# Patient Record
Sex: MALE | Race: WHITE | NOT HISPANIC OR LATINO | URBAN - METROPOLITAN AREA
[De-identification: names, ages, dates, MRNs, and addresses within clinical notes are randomized per-mention and may not be internally consistent; named-entity substitution may affect disease eponyms.]

---

## 2017-03-04 ENCOUNTER — EMERGENCY (EMERGENCY)
Facility: HOSPITAL | Age: 57
LOS: 0 days | Discharge: HOME | End: 2017-03-05

## 2017-06-27 DIAGNOSIS — M54.2 CERVICALGIA: ICD-10-CM
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
## 2017-06-27 DIAGNOSIS — E11.9 TYPE 2 DIABETES MELLITUS WITHOUT COMPLICATIONS: ICD-10-CM

## 2017-06-27 DIAGNOSIS — R52 PAIN, UNSPECIFIED: ICD-10-CM

## 2017-06-27 DIAGNOSIS — Z88.8 ALLERGY STATUS TO OTHER DRUGS, MEDICAMENTS AND BIOLOGICAL SUBSTANCES STATUS: ICD-10-CM
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
## 2017-06-27 DIAGNOSIS — K21.9 GASTRO-ESOPHAGEAL REFLUX DISEASE WITHOUT ESOPHAGITIS: ICD-10-CM

## 2017-06-27 DIAGNOSIS — Z88.5 ALLERGY STATUS TO NARCOTIC AGENT: ICD-10-CM
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
## 2017-09-11 ENCOUNTER — EMERGENCY (EMERGENCY)
Facility: HOSPITAL | Age: 57
LOS: 0 days | Discharge: HOME | End: 2017-09-11
Admitting: INTERNAL MEDICINE

## 2017-09-11 DIAGNOSIS — K21.9 GASTRO-ESOPHAGEAL REFLUX DISEASE WITHOUT ESOPHAGITIS: ICD-10-CM
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
## 2017-09-11 DIAGNOSIS — Z88.5 ALLERGY STATUS TO NARCOTIC AGENT: ICD-10-CM

## 2017-09-11 DIAGNOSIS — E11.9 TYPE 2 DIABETES MELLITUS WITHOUT COMPLICATIONS: ICD-10-CM

## 2017-09-11 DIAGNOSIS — S63.254A UNSPECIFIED DISLOCATION OF RIGHT RING FINGER, INITIAL ENCOUNTER: ICD-10-CM
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
## 2017-09-11 DIAGNOSIS — Z88.8 ALLERGY STATUS TO OTHER DRUGS, MEDICAMENTS AND BIOLOGICAL SUBSTANCES: ICD-10-CM
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
## 2017-09-11 DIAGNOSIS — Z91.048 OTHER NONMEDICINAL SUBSTANCE ALLERGY STATUS: ICD-10-CM

## 2017-09-11 DIAGNOSIS — Z79.899 OTHER LONG TERM (CURRENT) DRUG THERAPY: ICD-10-CM

## 2017-09-11 DIAGNOSIS — M79.644 PAIN IN RIGHT FINGER(S): ICD-10-CM

## 2018-01-12 PROBLEM — Z00.00 ENCOUNTER FOR PREVENTIVE HEALTH EXAMINATION: Status: ACTIVE | Noted: 2018-01-12

## 2018-01-31 ENCOUNTER — APPOINTMENT (OUTPATIENT)
Dept: UROLOGY | Facility: CLINIC | Age: 58
End: 2018-01-31
Payer: COMMERCIAL

## 2018-01-31 VITALS
BODY MASS INDEX: 26.18 KG/M2 | DIASTOLIC BLOOD PRESSURE: 63 MMHG | WEIGHT: 187 LBS | SYSTOLIC BLOOD PRESSURE: 110 MMHG | HEART RATE: 90 BPM | HEIGHT: 71 IN

## 2018-01-31 DIAGNOSIS — N41.9 INFLAMMATORY DISEASE OF PROSTATE, UNSPECIFIED: ICD-10-CM

## 2018-01-31 DIAGNOSIS — Z87.891 PERSONAL HISTORY OF NICOTINE DEPENDENCE: ICD-10-CM

## 2018-01-31 DIAGNOSIS — Z80.9 FAMILY HISTORY OF MALIGNANT NEOPLASM, UNSPECIFIED: ICD-10-CM

## 2018-01-31 DIAGNOSIS — F41.9 ANXIETY DISORDER, UNSPECIFIED: ICD-10-CM

## 2018-01-31 DIAGNOSIS — K21.9 GASTRO-ESOPHAGEAL REFLUX DISEASE W/OUT ESOPHAGITIS: ICD-10-CM

## 2018-01-31 DIAGNOSIS — Z78.9 OTHER SPECIFIED HEALTH STATUS: ICD-10-CM

## 2018-01-31 PROCEDURE — 99213 OFFICE O/P EST LOW 20 MIN: CPT

## 2018-01-31 RX ORDER — TAMSULOSIN HYDROCHLORIDE 0.4 MG/1
0.4 CAPSULE ORAL
Qty: 90 | Refills: 3 | Status: ACTIVE | COMMUNITY
Start: 2018-01-31 | End: 1900-01-01

## 2018-01-31 RX ORDER — OMEPRAZOLE 40 MG/1
40 CAPSULE, DELAYED RELEASE ORAL
Refills: 0 | Status: ACTIVE | COMMUNITY

## 2019-01-23 ENCOUNTER — APPOINTMENT (OUTPATIENT)
Dept: UROLOGY | Facility: CLINIC | Age: 59
End: 2019-01-23

## 2019-06-21 ENCOUNTER — APPOINTMENT (OUTPATIENT)
Dept: UROLOGY | Facility: CLINIC | Age: 59
End: 2019-06-21
Payer: COMMERCIAL

## 2019-06-21 VITALS
DIASTOLIC BLOOD PRESSURE: 63 MMHG | SYSTOLIC BLOOD PRESSURE: 110 MMHG | HEART RATE: 90 BPM | BODY MASS INDEX: 25.76 KG/M2 | WEIGHT: 184 LBS | HEIGHT: 71 IN

## 2019-06-21 DIAGNOSIS — N13.8 BENIGN PROSTATIC HYPERPLASIA WITH LOWER URINARY TRACT SYMPMS: ICD-10-CM

## 2019-06-21 DIAGNOSIS — N41.1 CHRONIC PROSTATITIS: ICD-10-CM

## 2019-06-21 DIAGNOSIS — N40.1 BENIGN PROSTATIC HYPERPLASIA WITH LOWER URINARY TRACT SYMPMS: ICD-10-CM

## 2019-06-21 PROCEDURE — 99213 OFFICE O/P EST LOW 20 MIN: CPT

## 2019-06-21 NOTE — ASSESSMENT
[Chronic Prostatitis (601.1\N41.1)] : of ~T knee [FreeTextEntry1] : Patient clinically has prostatitis along with lower urinary tract symptoms and will be treated with antibiotic Bactrim as he states he was given 2 course of Cipro by pmd without relief. Renal bladder sonogram will be done to check for hydronephrosis and bladder emptying. Likely has new-onset diabetes and will get fasting blood sugar and this may be contributing to his symptoms if his blood sugar is high

## 2019-06-21 NOTE — PHYSICAL EXAM
[General Appearance - In No Acute Distress] : no acute distress [Costovertebral Angle Tenderness] : no ~M costovertebral angle tenderness [Abdomen Soft] : soft [Abdomen Tenderness] : non-tender [No Prostate Nodules] : no prostate nodules [Prostate Size ___ gm] : prostate size [unfilled] gm [] : no respiratory distress [Oriented To Time, Place, And Person] : oriented to person, place, and time [Normal Station and Gait] : the gait and station were normal for the patient's age [FreeTextEntry1] : Prostate tender

## 2019-06-21 NOTE — REVIEW OF SYSTEMS
[Dysuria] : dysuria [Chest Pain] : no chest pain [Fever] : no fever [Confused] : no confusion [Shortness Of Breath] : no shortness of breath [Constipation] : no constipation

## 2019-06-21 NOTE — HISTORY OF PRESENT ILLNESS
[Hematuria - Gross] : no gross hematuria [FreeTextEntry1] : 59-year-old with history of BPH and lower urinary tract symptoms. 31 cc prostate on transrectal ultrasound in the past. PSA 2 years ago was 1.6. He is on tamsulosin 0.4 daily. He also has a history of prostatitis.\par \par A last several weeks he has had increased frequency urgency dysuria. Poor urinary stream with dribbling. Also pelvic pain and bilateral flank pain.\par \par UA shows no leukocytes or blood but does show sugar

## 2019-08-02 ENCOUNTER — APPOINTMENT (OUTPATIENT)
Dept: UROLOGY | Facility: CLINIC | Age: 59
End: 2019-08-02

## 2019-09-24 ENCOUNTER — APPOINTMENT (OUTPATIENT)
Dept: UROLOGY | Facility: CLINIC | Age: 59
End: 2019-09-24

## 2020-10-05 ENCOUNTER — EMERGENCY (EMERGENCY)
Facility: HOSPITAL | Age: 60
LOS: 0 days | Discharge: HOME | End: 2020-10-05
Attending: STUDENT IN AN ORGANIZED HEALTH CARE EDUCATION/TRAINING PROGRAM
Payer: COMMERCIAL

## 2020-10-05 VITALS
HEART RATE: 90 BPM | WEIGHT: 179.9 LBS | DIASTOLIC BLOOD PRESSURE: 71 MMHG | RESPIRATION RATE: 18 BRPM | SYSTOLIC BLOOD PRESSURE: 151 MMHG | OXYGEN SATURATION: 100 % | TEMPERATURE: 99 F

## 2020-10-05 VITALS — RESPIRATION RATE: 18 BRPM | SYSTOLIC BLOOD PRESSURE: 132 MMHG | DIASTOLIC BLOOD PRESSURE: 72 MMHG | HEART RATE: 82 BPM

## 2020-10-05 DIAGNOSIS — Z20.828 CONTACT WITH AND (SUSPECTED) EXPOSURE TO OTHER VIRAL COMMUNICABLE DISEASES: ICD-10-CM

## 2020-10-05 DIAGNOSIS — J45.909 UNSPECIFIED ASTHMA, UNCOMPLICATED: ICD-10-CM

## 2020-10-05 DIAGNOSIS — R19.7 DIARRHEA, UNSPECIFIED: ICD-10-CM

## 2020-10-05 DIAGNOSIS — R10.30 LOWER ABDOMINAL PAIN, UNSPECIFIED: ICD-10-CM

## 2020-10-05 DIAGNOSIS — F17.210 NICOTINE DEPENDENCE, CIGARETTES, UNCOMPLICATED: ICD-10-CM

## 2020-10-05 DIAGNOSIS — R50.9 FEVER, UNSPECIFIED: ICD-10-CM

## 2020-10-05 DIAGNOSIS — R51.9 HEADACHE, UNSPECIFIED: ICD-10-CM

## 2020-10-05 DIAGNOSIS — K21.9 GASTRO-ESOPHAGEAL REFLUX DISEASE WITHOUT ESOPHAGITIS: ICD-10-CM

## 2020-10-05 DIAGNOSIS — R10.9 UNSPECIFIED ABDOMINAL PAIN: ICD-10-CM

## 2020-10-05 DIAGNOSIS — E11.9 TYPE 2 DIABETES MELLITUS WITHOUT COMPLICATIONS: ICD-10-CM

## 2020-10-05 DIAGNOSIS — R05 COUGH: ICD-10-CM

## 2020-10-05 LAB
ALBUMIN SERPL ELPH-MCNC: 4.4 G/DL — SIGNIFICANT CHANGE UP (ref 3.5–5.2)
ALP SERPL-CCNC: 87 U/L — SIGNIFICANT CHANGE UP (ref 30–115)
ALT FLD-CCNC: 22 U/L — SIGNIFICANT CHANGE UP (ref 0–41)
ANION GAP SERPL CALC-SCNC: 11 MMOL/L — SIGNIFICANT CHANGE UP (ref 7–14)
ANISOCYTOSIS BLD QL: SLIGHT — SIGNIFICANT CHANGE UP
APPEARANCE UR: CLEAR — SIGNIFICANT CHANGE UP
AST SERPL-CCNC: 37 U/L — SIGNIFICANT CHANGE UP (ref 0–41)
BASOPHILS # BLD AUTO: 0.04 K/UL — SIGNIFICANT CHANGE UP (ref 0–0.2)
BASOPHILS NFR BLD AUTO: 0.9 % — SIGNIFICANT CHANGE UP (ref 0–1)
BILIRUB SERPL-MCNC: 0.2 MG/DL — SIGNIFICANT CHANGE UP (ref 0.2–1.2)
BILIRUB UR-MCNC: NEGATIVE — SIGNIFICANT CHANGE UP
BUN SERPL-MCNC: 10 MG/DL — SIGNIFICANT CHANGE UP (ref 10–20)
CALCIUM SERPL-MCNC: 8.6 MG/DL — SIGNIFICANT CHANGE UP (ref 8.5–10.1)
CHLORIDE SERPL-SCNC: 100 MMOL/L — SIGNIFICANT CHANGE UP (ref 98–110)
CO2 SERPL-SCNC: 25 MMOL/L — SIGNIFICANT CHANGE UP (ref 17–32)
COLOR SPEC: YELLOW — SIGNIFICANT CHANGE UP
CREAT SERPL-MCNC: 0.7 MG/DL — SIGNIFICANT CHANGE UP (ref 0.7–1.5)
DIFF PNL FLD: NEGATIVE — SIGNIFICANT CHANGE UP
EOSINOPHIL # BLD AUTO: 0 K/UL — SIGNIFICANT CHANGE UP (ref 0–0.7)
EOSINOPHIL NFR BLD AUTO: 0 % — SIGNIFICANT CHANGE UP (ref 0–8)
GIANT PLATELETS BLD QL SMEAR: PRESENT — SIGNIFICANT CHANGE UP
GLUCOSE SERPL-MCNC: 85 MG/DL — SIGNIFICANT CHANGE UP (ref 70–99)
GLUCOSE UR QL: NEGATIVE — SIGNIFICANT CHANGE UP
HCT VFR BLD CALC: 47.2 % — SIGNIFICANT CHANGE UP (ref 42–52)
HGB BLD-MCNC: 15.9 G/DL — SIGNIFICANT CHANGE UP (ref 14–18)
KETONES UR-MCNC: ABNORMAL
LACTATE SERPL-SCNC: 1.5 MMOL/L — SIGNIFICANT CHANGE UP (ref 0.7–2)
LEUKOCYTE ESTERASE UR-ACNC: NEGATIVE — SIGNIFICANT CHANGE UP
LIDOCAIN IGE QN: 35 U/L — SIGNIFICANT CHANGE UP (ref 7–60)
LYMPHOCYTES # BLD AUTO: 0.81 K/UL — LOW (ref 1.2–3.4)
LYMPHOCYTES # BLD AUTO: 17.6 % — LOW (ref 20.5–51.1)
MACROCYTES BLD QL: SLIGHT — SIGNIFICANT CHANGE UP
MANUAL SMEAR VERIFICATION: SIGNIFICANT CHANGE UP
MCHC RBC-ENTMCNC: 31.1 PG — HIGH (ref 27–31)
MCHC RBC-ENTMCNC: 33.7 G/DL — SIGNIFICANT CHANGE UP (ref 32–37)
MCV RBC AUTO: 92.4 FL — SIGNIFICANT CHANGE UP (ref 80–94)
MONOCYTES # BLD AUTO: 1.02 K/UL — HIGH (ref 0.1–0.6)
MONOCYTES NFR BLD AUTO: 22.2 % — HIGH (ref 1.7–9.3)
NEUTROPHILS # BLD AUTO: 2.26 K/UL — SIGNIFICANT CHANGE UP (ref 1.4–6.5)
NEUTROPHILS NFR BLD AUTO: 45.4 % — SIGNIFICANT CHANGE UP (ref 42.2–75.2)
NEUTS BAND # BLD: 3.7 % — SIGNIFICANT CHANGE UP (ref 0–6)
NITRITE UR-MCNC: NEGATIVE — SIGNIFICANT CHANGE UP
PH UR: 7.5 — SIGNIFICANT CHANGE UP (ref 5–8)
PLAT MORPH BLD: NORMAL — SIGNIFICANT CHANGE UP
PLATELET # BLD AUTO: 244 K/UL — SIGNIFICANT CHANGE UP (ref 130–400)
POLYCHROMASIA BLD QL SMEAR: SLIGHT — SIGNIFICANT CHANGE UP
POTASSIUM SERPL-MCNC: 5.2 MMOL/L — HIGH (ref 3.5–5)
POTASSIUM SERPL-SCNC: 5.2 MMOL/L — HIGH (ref 3.5–5)
PROT SERPL-MCNC: 6.9 G/DL — SIGNIFICANT CHANGE UP (ref 6–8)
PROT UR-MCNC: SIGNIFICANT CHANGE UP
RBC # BLD: 5.11 M/UL — SIGNIFICANT CHANGE UP (ref 4.7–6.1)
RBC # FLD: 13.3 % — SIGNIFICANT CHANGE UP (ref 11.5–14.5)
RBC BLD AUTO: ABNORMAL
SMUDGE CELLS # BLD: PRESENT — SIGNIFICANT CHANGE UP
SODIUM SERPL-SCNC: 136 MMOL/L — SIGNIFICANT CHANGE UP (ref 135–146)
SP GR SPEC: 1.02 — SIGNIFICANT CHANGE UP (ref 1.01–1.03)
UROBILINOGEN FLD QL: SIGNIFICANT CHANGE UP
VARIANT LYMPHS # BLD: 10.2 % — HIGH (ref 0–5)
WBC # BLD: 4.61 K/UL — LOW (ref 4.8–10.8)
WBC # FLD AUTO: 4.61 K/UL — LOW (ref 4.8–10.8)

## 2020-10-05 PROCEDURE — 99285 EMERGENCY DEPT VISIT HI MDM: CPT | Mod: CR

## 2020-10-05 PROCEDURE — 71260 CT THORAX DX C+: CPT | Mod: 26

## 2020-10-05 PROCEDURE — 71045 X-RAY EXAM CHEST 1 VIEW: CPT | Mod: 26

## 2020-10-05 PROCEDURE — 74177 CT ABD & PELVIS W/CONTRAST: CPT | Mod: 26

## 2020-10-05 RX ORDER — ACETAMINOPHEN 500 MG
975 TABLET ORAL ONCE
Refills: 0 | Status: COMPLETED | OUTPATIENT
Start: 2020-10-05 | End: 2020-10-05

## 2020-10-05 RX ORDER — SODIUM CHLORIDE 9 MG/ML
1000 INJECTION, SOLUTION INTRAVENOUS ONCE
Refills: 0 | Status: COMPLETED | OUTPATIENT
Start: 2020-10-05 | End: 2020-10-05

## 2020-10-05 RX ADMIN — SODIUM CHLORIDE 1000 MILLILITER(S): 9 INJECTION, SOLUTION INTRAVENOUS at 14:36

## 2020-10-05 RX ADMIN — Medication 975 MILLIGRAM(S): at 14:36

## 2020-10-05 NOTE — ED PROVIDER NOTE - NSFOLLOWUPINSTRUCTIONS_ED_ALL_ED_FT
Abdominal Pain    Many things can cause abdominal pain. Many times, abdominal pain is not caused by a disease and will improve without treatment. Your health care provider will do a physical exam to determine if there is a dangerous cause of your pain; blood tests and imaging may help determine the cause of your pain. However, in many cases, no cause may be found and you may need further testing as an outpatient. Monitor your abdominal pain for any changes.     SEEK IMMEDIATE MEDICAL CARE IF YOU HAVE ANY OF THE FOLLOWING SYMPTOMS: worsening abdominal pain, uncontrollable vomiting, profuse diarrhea, inability to have bowel movements or pass gas, black or bloody stools, fever accompanying chest pain or back pain, or fainting. These symptoms may represent a serious problem that is an emergency. Do not wait to see if the symptoms will go away. Get medical help right away. Call 911 and do not drive yourself to the hospital.    Fever    A fever is an increase in the body's temperature above 100.4°F (38°C) or higher. In adults and children older than three months, a brief mild or moderate fever generally has no long-term effect, and it usually does not require treatment. Many times, fevers are the result of viral infections, which are self-resolving.  However, certain symptoms or diagnostic tests may suggest a bacterial infection that may respond to antibiotics. Take medications as directed by your health care provider.    SEEK IMMEDIATE MEDICAL CARE IF YOU OR YOUR CHILD HAVE ANY OF THE FOLLOWING SYMPTOMS : shortness of breath, seizure, rash/stiff neck/headache, severe abdominal pain, persistent vomiting, any signs of dehydration, or if your child has a fever for over five (5) days.    Headache    A headache is pain or discomfort felt around the head or neck area. The specific cause of a headache may not be found as there are many types including tension headaches, migraine headaches, and cluster headaches. Watch your condition for any changes. Things you can do to manage your pain include taking over the counter and prescription medications as instructed by your health care provider, lying down in a dark quiet room, limiting stress, getting regular sleep, and refraining from alcohol and tobacco products.    SEEK IMMEDIATE MEDICAL CARE IF YOU HAVE ANY OF THE FOLLOWING SYMPTOMS: fever, vomiting, stiff neck, loss of vision, problems with speech, muscle weakness, loss of balance, trouble walking, passing out, or confusion.    Please follow up with ENT and primary care doctor in 1 to 2 weeks.

## 2020-10-05 NOTE — ED PROVIDER NOTE - PATIENT PORTAL LINK FT
You can access the FollowMyHealth Patient Portal offered by [facility] by registering at the following website: [email or web address]. By joining [facility]’s FollowMyHealth portal, you will also be able to view your health information using other applications (apps) compatible with our system.

## 2020-10-05 NOTE — ED PROVIDER NOTE - OBJECTIVE STATEMENT
Patient is a 60 male with PMHx of DM, asthma, and GERD c/o fever, headache, diarrhea, and abdominal pain since 6 days ago. The patient had been having multiple episodes of watery brown nonbloody diarrhea since 6 days ago. Also, complains of suprapubic abdominal pain, sharp, nonradiating, constant since then. Also developed fever and chills for past couple of days, Temp up to 102.1, taking Tylenol with improvement of fever, last one last night. Also, complains of diffuse throbbing headache, mild, nonradiating, improved with Tylenol. Denies neck stiffness, chest pain, SOB, cough, n/v. Went to urgent care today, got covid swab, and told to come to ED for CT abdomen for abdominal pain.

## 2020-10-05 NOTE — ED ADULT NURSE NOTE - OBJECTIVE STATEMENT
Pt presented to ED c/o abd pain. Pt c/o abd pain x 3days. Denies n/v/d. Pt c/o intermittent fevers/chills. Pt A&Ox4, ambulatory.

## 2020-10-05 NOTE — ED PROVIDER NOTE - NS ED ROS FT
Review of Systems:  •	CONSTITUTIONAL - + fever, No diaphoresis, No weight change, + chills  •	SKIN - No rash  •	HEMATOLOGIC - No abnormal bleeding or bruising  •	EYES - No eye pain, No blurred vision  •	ENT - No change in hearing, No sore throat, No neck pain, No rhinorrhea, No ear pain  •	RESPIRATORY - No shortness of breath, No cough  •	CARDIAC -No chest pain, No palpitations  •	GI - + abdominal pain, No nausea, No vomiting, + diarrhea, No constipation, No bright red blood per rectum or melena. No flank pain  •                 - No dysuria, frequency, hematuria.   •	ENDO - No polydypsia, No polyuria, No heat/cold intolerance  •	MUSCULOSKELETAL - No joint paint, No swelling, No back pain  •	NEUROLOGIC - No numbness, No focal weakness, + headache, No dizziness  All other systems negative, unless specified in HPI

## 2020-10-05 NOTE — ED PROVIDER NOTE - PROGRESS NOTE DETAILS
MQ: Pain improved, discussed results and need for repeat CT for mass in neck and pulmonary nodule, will d/c with ENT and PMD f/u MQ: Pain improved, discussed results and need for repeat CT for mass in neck and pulmonary nodule, patient wants to sign against medical advice, wants to go home, explains need to stay for final report of CT to make sure there is nothing wrong with the abdomen, explained risks of severe morbidity and death due to not staying for final report if anything dire is resulted, will give ENT and PMD f/u nav: pt told of findings and need for further workup. pt requesting to leave ama.  I had extensive discussion of risks and benefits of pursuing further medical evaluation and/or care with patient and any available family/friends, including but not limited to worsening of clinical status, disability, and death; patient still electing to leave against medical advice. Patient is awake, alert, oriented and demonstrates full capacity and insight into illness. Patient aware and encouraged to return immediately to this ED or nearest ED if patient decides to change mind regarding care or if patient experiences any new, worsening, or concerning symptoms. Any available test results were discussed with patient and/or family. Verbal instructions given, patient endorsed understanding. Written discharge instructions additionally given, including follow-up plan.

## 2020-10-05 NOTE — ED PROVIDER NOTE - PHYSICAL EXAMINATION
CONSTITUTIONAL: Well-developed; well-nourished; in no acute distress.   SKIN: warm, dry  HEAD: Normocephalic; atraumatic.  EYES: no conjunctival injection. PERRL.   ENT: No nasal discharge; airway clear.  NECK: Supple; non tender.  CARD: S1, S2 normal; no murmurs, gallops, or rubs. Regular rate and rhythm.   RESP: No wheezes, rales or rhonchi.  ABD: soft, tender in suprapubic area, no rebound tenderness or guarding, no CVAT b/l  BACK: No midline tenderness  EXT: Normal ROM.  No clubbing, cyanosis or edema. No hip tenderness b/l.  LYMPH: No acute cervical adenopathy.  NEURO: Alert, oriented, grossly unremarkable  PSYCH: Cooperative, appropriate.

## 2020-10-05 NOTE — ED PROVIDER NOTE - CARE PROVIDERS DIRECT ADDRESSES
,DirectAddress_Unknown,[email or web address]

## 2020-10-05 NOTE — ED ADULT TRIAGE NOTE - CHIEF COMPLAINT QUOTE
Pt c/o abd pain x couple of days. Denies n/v/d. Pt c/o intermittent fevers/chills. Pt A&Ox4, ambulatory.

## 2020-10-05 NOTE — ED PROVIDER NOTE - CARE PROVIDER_API CALL
LAUREEN PERAZA  [id number]  [address] [place] [address]  Phone: [phone]  Fax: [phone]  Follow Up Time: 7-10 Days    Drew Potts  OTOLARYNGOLOGY  [address] [place] [address], [address] [place] [address]  Phone: [phone]  Fax: [phone]  Follow Up Time: 7-10 Days

## 2020-10-05 NOTE — ED ADULT NURSE REASSESSMENT NOTE - NS ED NURSE REASSESS COMMENT FT1
Pt states "I want to go home, I've been here for so many hours". MD Mahajan aware. No IV noted. Pt states "I want to go home, I've been here for so many hours". MD Mahajan aware. No IV noted. Pt AMA'd.

## 2020-10-05 NOTE — ED PROVIDER NOTE - REFUSAL OF SERVICE, MDM
wants to go home, explains need to stay for final report of CT to make sure there is nothing wrong with the abdomen, explained risks of severe morbidity and death due to not staying for final report if anything dire is resulted,

## 2020-10-05 NOTE — ED PROVIDER NOTE - CARE PLAN
Principal Discharge DX:	Abdominal pain  Secondary Diagnosis:	Diarrhea  Secondary Diagnosis:	Fever and chills  Secondary Diagnosis:	Headache

## 2020-10-05 NOTE — ED PROVIDER NOTE - CLINICAL SUMMARY MEDICAL DECISION MAKING FREE TEXT BOX
60 yr old m that presents with diarrhea, general malaise. COVID swab obtained at urgent care and was referred to ed for CT. Obtained labs, and imaging. Pt was informed of findings on imaging. However, pt did not want to wait for further evaluation and left AMA.

## 2020-10-05 NOTE — ED PROVIDER NOTE - ATTENDING CONTRIBUTION TO CARE
60 yr old m w/ a pmh significant for dm, asthma, GERD who presents today with body aches, diarrhea and fever. Pt states that this has been going on for a couple of days. Today pt went to urgent care, where he was swab for COVID and also sent to the ED for CT due to the diarrhea. Pt denies any recent travels, recent contacts, chest pain, SOB or any other complaints.     Review of Systems    Constitutional: (-) fever  Cardiovascular: (-) chest pain, (-) syncope  Respiratory: (-) cough, (-) shortness of breath  Gastrointestinal: (-) vomiting, (+) diarrhea, (-) abdominal pain  Musculoskeletal: (-) neck pain, (-) back pain, (-) joint pain  Integumentary: (-) rash, (-) edema  Neurological: (-) headache, (-) altered mental status    Except as documented in the HPI, all other systems are negative.    VITAL SIGNS: I have reviewed nursing notes and confirm.  CONSTITUTIONAL: non-toxic, well appearing  SKIN: no rash, no petechiae.  EYES: PERRL, EOMI, pink conjunctiva, anicteric  ENT: tongue midline, no exudates, MMM  NECK: Supple; no meningismus, no JVD  CARD: RRR, no murmurs, equal radial pulses bilaterally 2+  RESP: CTAB, no respiratory distress  ABD: Soft, non-tender, non-distended, no peritoneal signs, no HSM, no CVA tenderness  EXT: Normal ROM x4. No edema. No calves tenderness  NEURO: Alert, oriented. CN2-12 intact, equal strength bilaterally, nl gait.  PSYCH: Cooperative, appropriate.    a/p  60 yr old m who presents with body aches, diarrhea and fevers, concern for COVID   -labs  -imaging  -pain management  -dispo as per above

## 2020-10-05 NOTE — ED PROVIDER NOTE - PROVIDER TOKENS
PROVIDER:[TOKEN:[[id number]],FOLLOWUP:[7-10 Days]],PROVIDER:[TOKEN:[[id number]],FOLLOWUP:[7-10 Days]]

## 2020-10-06 LAB
CULTURE RESULTS: SIGNIFICANT CHANGE UP
SARS-COV-2 RNA SPEC QL NAA+PROBE: DETECTED
SPECIMEN SOURCE: SIGNIFICANT CHANGE UP

## 2020-10-08 ENCOUNTER — APPOINTMENT (OUTPATIENT)
Dept: OTOLARYNGOLOGY | Facility: CLINIC | Age: 60
End: 2020-10-08

## 2021-01-21 PROBLEM — E11.9 TYPE 2 DIABETES MELLITUS WITHOUT COMPLICATIONS: Chronic | Status: ACTIVE | Noted: 2020-10-05

## 2021-01-21 PROBLEM — J45.909 UNSPECIFIED ASTHMA, UNCOMPLICATED: Chronic | Status: ACTIVE | Noted: 2020-10-05

## 2021-01-21 PROBLEM — K21.9 GASTRO-ESOPHAGEAL REFLUX DISEASE WITHOUT ESOPHAGITIS: Chronic | Status: ACTIVE | Noted: 2020-10-05

## 2021-01-22 ENCOUNTER — APPOINTMENT (OUTPATIENT)
Dept: ORTHOPEDIC SURGERY | Facility: CLINIC | Age: 61
End: 2021-01-22
Payer: MEDICAID

## 2021-01-22 VITALS
SYSTOLIC BLOOD PRESSURE: 124 MMHG | DIASTOLIC BLOOD PRESSURE: 84 MMHG | HEIGHT: 71 IN | TEMPERATURE: 98 F | OXYGEN SATURATION: 98 % | WEIGHT: 175 LBS | HEART RATE: 97 BPM | BODY MASS INDEX: 24.5 KG/M2

## 2021-01-22 DIAGNOSIS — Z86.39 PERSONAL HISTORY OF OTHER ENDOCRINE, NUTRITIONAL AND METABOLIC DISEASE: ICD-10-CM

## 2021-01-22 DIAGNOSIS — Z87.09 PERSONAL HISTORY OF OTHER DISEASES OF THE RESPIRATORY SYSTEM: ICD-10-CM

## 2021-01-22 PROCEDURE — 99072 ADDL SUPL MATRL&STAF TM PHE: CPT

## 2021-01-22 PROCEDURE — 73030 X-RAY EXAM OF SHOULDER: CPT | Mod: LT

## 2021-01-22 PROCEDURE — 99203 OFFICE O/P NEW LOW 30 MIN: CPT

## 2021-01-22 RX ORDER — DULAGLUTIDE 4.5 MG/.5ML
INJECTION, SOLUTION SUBCUTANEOUS
Refills: 0 | Status: ACTIVE | COMMUNITY

## 2021-01-22 RX ORDER — ATORVASTATIN CALCIUM 80 MG/1
TABLET, FILM COATED ORAL
Refills: 0 | Status: ACTIVE | COMMUNITY

## 2021-01-22 RX ORDER — CYCLOBENZAPRINE HYDROCHLORIDE 7.5 MG/1
TABLET, FILM COATED ORAL
Refills: 0 | Status: ACTIVE | COMMUNITY

## 2021-01-22 RX ORDER — SULFAMETHOXAZOLE AND TRIMETHOPRIM 800; 160 MG/1; MG/1
800-160 TABLET ORAL
Qty: 14 | Refills: 0 | Status: DISCONTINUED | COMMUNITY
Start: 2019-06-21 | End: 2021-01-22

## 2021-01-22 RX ORDER — TAMSULOSIN HYDROCHLORIDE 0.4 MG/1
0.4 CAPSULE ORAL
Refills: 0 | Status: DISCONTINUED | COMMUNITY
End: 2021-01-22

## 2021-01-22 RX ORDER — ALPRAZOLAM 2 MG/1
2 TABLET ORAL
Refills: 0 | Status: ACTIVE | COMMUNITY

## 2021-01-22 RX ORDER — PIOGLITAZONE HYDROCHLORIDE 45 MG/1
TABLET ORAL
Refills: 0 | Status: ACTIVE | COMMUNITY

## 2021-01-22 RX ORDER — FINASTERIDE 1 MG/1
TABLET ORAL
Refills: 0 | Status: ACTIVE | COMMUNITY

## 2021-01-29 ENCOUNTER — APPOINTMENT (OUTPATIENT)
Dept: ORTHOPEDIC SURGERY | Facility: CLINIC | Age: 61
End: 2021-01-29

## 2021-03-12 ENCOUNTER — APPOINTMENT (OUTPATIENT)
Dept: ORTHOPEDIC SURGERY | Facility: CLINIC | Age: 61
End: 2021-03-12
Payer: MEDICAID

## 2021-04-09 ENCOUNTER — APPOINTMENT (OUTPATIENT)
Dept: ORTHOPEDIC SURGERY | Facility: CLINIC | Age: 61
End: 2021-04-09
Payer: MEDICAID

## 2021-04-23 ENCOUNTER — APPOINTMENT (OUTPATIENT)
Dept: ORTHOPEDIC SURGERY | Facility: CLINIC | Age: 61
End: 2021-04-23
Payer: MEDICAID

## 2021-04-23 VITALS
SYSTOLIC BLOOD PRESSURE: 118 MMHG | HEIGHT: 71 IN | DIASTOLIC BLOOD PRESSURE: 80 MMHG | OXYGEN SATURATION: 96 % | BODY MASS INDEX: 24.5 KG/M2 | WEIGHT: 175 LBS | HEART RATE: 99 BPM | TEMPERATURE: 97.9 F

## 2021-04-23 DIAGNOSIS — S46.012A STRAIN OF MUSCLE(S) AND TENDON(S) OF THE ROTATOR CUFF OF LEFT SHOULDER, INITIAL ENCOUNTER: ICD-10-CM

## 2021-04-23 DIAGNOSIS — S42.252A DISPLACED FRACTURE OF GREATER TUBEROSITY OF LEFT HUMERUS, INITIAL ENCOUNTER FOR CLOSED FRACTURE: ICD-10-CM

## 2021-04-23 PROCEDURE — 73030 X-RAY EXAM OF SHOULDER: CPT | Mod: LT

## 2021-04-23 PROCEDURE — 99072 ADDL SUPL MATRL&STAF TM PHE: CPT

## 2021-04-23 PROCEDURE — 99213 OFFICE O/P EST LOW 20 MIN: CPT

## 2021-04-23 RX ORDER — SITAGLIPTIN AND METFORMIN HYDROCHLORIDE 50; 1000 MG/1; MG/1
TABLET, FILM COATED ORAL
Refills: 0 | Status: DISCONTINUED | COMMUNITY
End: 2021-04-23

## 2021-04-23 RX ORDER — SITAGLIPTIN AND METFORMIN HYDROCHLORIDE 50; 1000 MG/1; MG/1
TABLET, FILM COATED ORAL
Refills: 0 | Status: ACTIVE | COMMUNITY

## 2021-04-23 RX ORDER — METFORMIN HYDROCHLORIDE 1000 MG/1
1000 TABLET, COATED ORAL
Refills: 0 | Status: ACTIVE | COMMUNITY

## 2022-08-19 ENCOUNTER — APPOINTMENT (OUTPATIENT)
Dept: PULMONOLOGY | Facility: CLINIC | Age: 62
End: 2022-08-19